# Patient Record
(demographics unavailable — no encounter records)

---

## 2018-01-26 NOTE — EDM.PDOC
ED HPI GENERAL MEDICAL PROBLEM





- General


Chief Complaint: Upper Extremity Injury/Pain


Stated Complaint: PAIN LT WRIST


Time Seen by Provider: 01/26/18 18:59


Source of Information: Reports: Patient, Family


History Limitations: Reports: No Limitations





- History of Present Illness


INITIAL COMMENTS - FREE TEXT/NARRATIVE: 


PEDS HISTORY AND PHYSICAL:





History of present illness:


Patient is a 9-year-old female who presents to the emergency room today with 

complaints of left wrist pain after falling at recess this afternoon. Since 

that time she has had increased pain with movement of the wrist, no difficulty 

grasping or using her hand.





Review of systems: 


As per history of present illness and below otherwise all systems reviewed and 

negative.





Past medical history: 


As per history of present illness and as reviewed below otherwise 

noncontributory.





Surgical history: 


As per history of present illness and as reviewed below otherwise 

noncontributory.





Social history: 


No reported history of drug or alcohol abuse.





Family history: 


As per history of present illness and as reviewed below otherwise 

noncontributory.





Physical exam:


Gen.: Nontoxic-appearing 9-year-old female. Alert and oriented. Appears in no 

acute distress.


HEENT: Atraumatic, normocephalic, pupils reactive, negative for conjunctival 

pallor or scleral icterus, mucous membranes moist, throat clear, neck supple, 

nontender, trachea midline.  TMs normal bilaterally, no cervical adenopathy or 

nuchal rigidity.  


Lungs: Clear to auscultation, breath sounds equal bilaterally, chest nontender.


Heart: S1S2, regular rate and rhythm, no overt murmurs


Abdomen: Soft, nondistended, nontender. Negative for masses or 

hepatosplenomegaly. Normal abdominal bowel sounds.  


Pelvis: Stable nontender.


Genitourinary: Deferred.


Rectal: Deferred.


Extremities: All extremities, good flexion and dorsiflexion of the wrist, full 

range of motion without defects or deficits. Strong radial pulse bilaterally. 

Capillary refill less than 3 seconds. Neurovascular unremarkable.


Neuro: Awake, alert, and age appropriate. Cranial nerves II through XII 

unremarkable. Cerebellum unremarkable. Motor and sensory unremarkable 

throughout. Exam nonfocal.


Skin:  Normal turgor, no overt rash or lesions. Mild/faint bruising noted to 

the left wrist near the ulnar side.





X-ray shows no acute fracture. The radiologist does report if there is 

tenderness to the snuffbox to have the patient follow-up in 7-10 days to 

exclude an occult scaphoid fracture. The child has minimal tenderness. We'll 

place the patient in a cockup wrist splint. Explained this to the father and he 

will follow up with orthopedic provider if she continues to have pain. 

Encouraged patient to wear the splint for 3-5 days. Rest, ice, elevate the 

extremity.





Diagnostics:


xray left wrist





Therapeutics:


Ice, splint





Impression: 


Left wrist injury





Plan:


1. Please wear the splint that has been given to you for the next 3-5 days. Rest

, ice, elevate the extremity.


2. Tylenol and/or ibuprofen as needed for pain and swelling.


3. Follow up with the orthopedic provider as we discussed. Return to the ED as 

needed and as discussed.





Definitive disposition and diagnosis as appropriate pending reevaluation and 

review of above.


Onset: Today


Duration: Hour(s):


Location: Reports: Upper Extremity, Left


  ** left wrist\


Pain Score (Numeric/FACES): 8





- Related Data


 Allergies











Allergy/AdvReac Type Severity Reaction Status Date / Time


 


azithromycin [From Zithromax] Allergy  Hives Verified 01/26/18 18:39











Home Meds: 


 Home Meds





. [No Known Home Meds]  01/26/18 [History]











Past Medical History





- Past Health History


Medical/Surgical History: Denies Medical/Surgical History





Social & Family History





- Family History


Family Medical History: Noncontributory





- Tobacco Use


Smoking Status *Q: Never Smoker


Second Hand Smoke Exposure: No





- Recreational Drug Use


Recreational Drug Use: No





Review of Systems





- Review of Systems


Review Of Systems: ROS reveals no pertinent complaints other than HPI.





ED EXAM, GENERAL





- Physical Exam


Exam: See Below (See dictation)





Course





- Vital Signs


Last Recorded V/S: 


 Last Vital Signs











Temp  97.6 F   01/26/18 18:45


 


Pulse  87   01/26/18 18:45


 


Resp  20   01/26/18 18:45


 


BP  130/62 H  01/26/18 18:45


 


Pulse Ox  99   01/26/18 18:45














- Orders/Labs/Meds


Orders: 


 Active Orders 24 hr











 Category Date Time Status


 


 Wrist 2V Lt [CR] Stat Exams  01/26/18 18:27 Taken


 


 DME for Discharge [COMM] Stat Oth  01/26/18 19:28 Ordered














Departure





- Departure


Time of Disposition: 19:39


Disposition: Home, Self-Care 01


Clinical Impression: 


Left wrist injury


Qualifiers:


 Encounter type: initial encounter Qualified Code(s): S69.92XA - Unspecified 

injury of left wrist, hand and finger(s), initial encounter








- Discharge Information


Instructions:  Wrist Sprain


Referrals: 


Fritz Dow DO [Primary Care Provider] - 


Forms:  ED Department Discharge


Additional Instructions: 


My general discharge


The following information is given to patients seen in the emergency department 

who are being discharged to home. This information is to outline your options 

for follow-up care. We provide all patients seen in our emergency department 

with a follow-up referral.





The need for follow-up, as well as the timing and circumstances, are variable 

depending upon the specifics of your emergency department visit.





If you don't have a primary care physician on staff, we will provide you with a 

referral. We always advise you to contact your personal physician following an 

emergency department visit to inform them of the circumstance of the visit and 

for follow-up with them and/or the need for any referrals to a consulting 

specialist.





The emergency department will also refer you to a specialist when appropriate. 

This referral assures that you have the opportunity for follow-up care with a 

specialist. All of these measure are taken in an effort to provide you with 

optimal care, which includes your follow-up.





Under all circumstances we always encourage you to contact your private 

physician who remains a resource for coordinating your care. When calling for 

follow-up care, please make the office aware that this follow-up is from your 

recent emergency room visit. If for any reason you are refused follow-up, 

please contact the [facility] Emergency 

Department at [phone] and asked to speak to the emergency department 

charge nurse.





[facility]


Specialty Care - Orthopedic Clinic


[address], [address]


[place] [address]


Phone: [phone]


Fax: [phone]





1. Please wear the splint that has been given to you for the next 3-5 days. Rest

, ice, elevate the extremity.


2. Tylenol and/or ibuprofen as needed for pain and swelling.


3. Follow up with the orthopedic provider as we discussed. Return to the ED as 

needed and as discussed.





- My Orders


Last 24 Hours: 


My Active Orders





01/26/18 19:28


DME for Discharge [COMM] Stat 














- Assessment/Plan


Last 24 Hours: 


My Active Orders





01/26/18 19:28


DME for Discharge [COMM] Stat

## 2018-01-29 NOTE — CR
EXAM DATE: 18



PATIENT'S AGE: 9





Patient: CARMELA SOLOMON



Facility: [place], [place]

Patient ID: [id number]

Site Patient ID: [id number].

Site Accession #: [id number]

: 2008

Study: XRay Extremity Left [id number]/26/2018 7:08:50 PM

Ordering Physician: Doctor Temp



Final Report: 

HISTORY:

Fall, pain and bruising.



FINDINGS:

Two views of the left wrist demonstrate the patient is skeletally immature. 
There is normal alignment present. No fracture line seen.



IMPRESSION:

No fracture identified. If snuffbox tenderness is present and persistent, a 
follow up exam in 7-10 days may be of value to exclude an occult scaphoid 
fracture.



Dictated by Rhonda Meléndez MD @ 2018 7:17:14 PM





Dictated by: Rhonda Meléndez MD @ 2018 19:17:23

(Electronic Signature)



Report Signed by Proxy.
[email or web address]